# Patient Record
(demographics unavailable — no encounter records)

---

## 2024-11-12 NOTE — DATA REVIEWED
[FreeTextEntry1] : Bilateral knee x-ray was obtained today in the office (11/12/2024) which showed moderate to severe right knee OA and severe left knee OA

## 2024-11-12 NOTE — PHYSICAL EXAM
[de-identified] : L knee  No rashes, erythema, mild edema noted   TTP at medial joint line Stability: no gross instability  ROM: Painful ROM with flexion Gait: cautious, antalgic

## 2024-11-12 NOTE — HISTORY OF PRESENT ILLNESS
[FreeTextEntry1] : Patient is an 80-year-old female who is here today to establish care for left knee pain that she has had for the past 20 years.  Pain is made worse with any weight bearing movements. Pain is the most severe when going up and down the stairs. Pain is associated with sharp, shooting, stabbing pains. She has had previous injection therapy including 3 gel injection and for steroid injection which both did not give her any relief, she has not had any injection for the past 10 years. Patient also had a meniscus repair in 2012 by Dr. Dr. Marquis Aldana (orthopedic surgeon) which she states gave her no relief. She states that she is trialed NSAIDs and lidocaine creams in the past with no relief.  Bilateral knee x-ray was obtained today in the office (11/12/2024) which showed moderate to severe right knee OA and severe left knee OA.

## 2024-11-12 NOTE — DISCUSSION/SUMMARY
[de-identified] : A discussion regarding available pain management treatment options occurred with the patient. These included interventional, rehabilitative, pharmacological, and alternative modalities. We will proceed with the following:   Interventional/ Procedures: 1. Plan will be to proceed with a left knee genicular nerve block under fluoroscopic guidance.  No MAC - Patient has chronic knee pain that has not responded to 3 months of conservative therapy. The pain is interfering with activities of daily living and functionality. There is no history of systemic infection, unstable medical condition, bleeding tendency, or local infection. The injection is being performed to diagnose the genicular nerve a source of the individual's pain, to which we can apply radiofrequency ablation for more sustained relief - if the block is positive.   Imagin.  Patient had a bilateral knee x-ray done today in the office which showed right knee moderate to severe OA and left knee severe OA   Rehabilitative/alternative modalities: 1. Initiate physician directed activity and lifestyle modifications. 2. Participation in active HEP was discussed and printed.    Total clinician time spent today on the patient is 30 minutes including preparing to see the patient, obtaining and/or reviewing and confirming history, performing medically necessary and appropriate examination, counseling and educating the patient and/or family, documenting clinical information in the EHR and communicating and/or referring to other healthcare professionals.  Follow-up 2 weeks postinjection  NUNO Tamayo DO

## 2024-11-12 NOTE — HISTORY OF PRESENT ILLNESS
[FreeTextEntry1] : Patient is an 80-year-old female who is here today to establish care for left knee pain that she has had for the past 20 years.  Pain is made worse with any weight bearing movements. Pain is the most severe when going up and down the stairs. Pain is associated with sharp, shooting, stabbing pains. She has had previous injection therapy including 3 gel injection and for steroid injection which both did not give her any relief, she has not had any injection for the past 10 years. Patient also had a meniscus repair in 2012 by Dr. Dr. Marqusi Aldana (orthopedic surgeon) which she states gave her no relief. She states that she is trialed NSAIDs and lidocaine creams in the past with no relief.  Bilateral knee x-ray was obtained today in the office (11/12/2024) which showed moderate to severe right knee OA and severe left knee OA.

## 2024-11-12 NOTE — DISCUSSION/SUMMARY
[de-identified] : A discussion regarding available pain management treatment options occurred with the patient. These included interventional, rehabilitative, pharmacological, and alternative modalities. We will proceed with the following:   Interventional/ Procedures: 1. Plan will be to proceed with a left knee genicular nerve block under fluoroscopic guidance.  No MAC - Patient has chronic knee pain that has not responded to 3 months of conservative therapy. The pain is interfering with activities of daily living and functionality. There is no history of systemic infection, unstable medical condition, bleeding tendency, or local infection. The injection is being performed to diagnose the genicular nerve a source of the individual's pain, to which we can apply radiofrequency ablation for more sustained relief - if the block is positive.   Imagin.  Patient had a bilateral knee x-ray done today in the office which showed right knee moderate to severe OA and left knee severe OA   Rehabilitative/alternative modalities: 1. Initiate physician directed activity and lifestyle modifications. 2. Participation in active HEP was discussed and printed.    Total clinician time spent today on the patient is 30 minutes including preparing to see the patient, obtaining and/or reviewing and confirming history, performing medically necessary and appropriate examination, counseling and educating the patient and/or family, documenting clinical information in the EHR and communicating and/or referring to other healthcare professionals.  Follow-up 2 weeks postinjection  NUNO Tamayo DO

## 2024-11-12 NOTE — PHYSICAL EXAM
[de-identified] : L knee  No rashes, erythema, mild edema noted   TTP at medial joint line Stability: no gross instability  ROM: Painful ROM with flexion Gait: cautious, antalgic

## 2024-11-19 NOTE — PROCEDURE
[FreeTextEntry3] : Date of Service: 11/19/2024   Account: [id number]   Patient: JANELL RUDD   YOB: 1944   Age: 80 year   Surgeon:  Gaurav Eddy DO  Assistant: None   Pre-Operative Diagnosis: 1) Chronic left knee pain   Post Operative Diagnosis: 1) Chronic left knee pain   Procedure: 1) Left superior medial genicular nerve block with fluoroscopic guidance 2) Left superior lateral genicular nerve block with fluoroscopic guidance 3) Left inferomedial genicular nerve block with fluoroscopic guidance   Anesthesia:  none   This procedure was carried out using fluoroscopic guidance.  The risks and benefits of the procedure were discussed extensively with the patient.  The consent of the patient was obtained and the following procedure was performed.  The patient was placed in the supine position on the fluoroscopy table with the right knee extended straight and left knee flexed to allow for fluoroscopic lateral view of the left knee. A timeout was performed with all essential staff present and the site and side were verified.   Local anesthesia consisting of Lidocaine 1% was injected subcutaneously with a 25 gauge needle at each of the nerve block needle entry sites.  Then a 25g 3.5 spinal needle was advanced to the medial flare of the femur metaphysis/diaphysis junction on the left side. A second 25g 3.5 spinal needle was advanced to the lateral flare of the metaphysis/diaphysis junction on the left knee. A third 25g 3.5 spinal needle was advanced to the medial flare of the tibial metaphysis/diaphysis junction of the left knee. The needle position was confirmed in A/P and lateral fluoroscopic views to be at the midway point of the AP diameter of the long bone femur / tibia. 1mL of a mixture of 0.25% bupivicaine and 10mg decadron was injected at each of the three sites.   The needles were subsequently removed.  Vital signs remained normal.  Pulse oximeter was used throughout the procedure and the patient's pulse and oxygen saturation remained within normal limits.  The patient tolerated the procedure well.  There were no complications.  The patient was instructed to apply ice over the injection sites for twenty minutes every two hours for the next 24 to 48 hours.   Disposition:   1. The patient was advised to F/U in 1-2 weeks to assess the response to the injection. 2. The patient was also instructed to contact me immediately if there were any concerns related to the procedure performed.

## 2024-11-29 NOTE — PHYSICAL EXAM
[de-identified] : L knee  No rashes, erythema, mild edema noted   TTP at medial joint line Stability: no gross instability  ROM: Painful ROM with flexion Gait: cautious, antalgic

## 2024-11-29 NOTE — DISCUSSION/SUMMARY
[de-identified] : A discussion regarding available pain management treatment options occurred with the patient. These included interventional, rehabilitative, pharmacological, and alternative modalities. We will proceed with the following:   Interventional treatment options: 1. Proceed with a second confirmatory left knee genicular nerve block under sedation.  Patient has left knee chronic pain that has not responded to 3 months of conservative therapy. The pain is interfering with activities of daily living and functionality.  There is no history of systemic infection, unstable medical condition, bleeding tendency, or local infection.  The injection is being performed to diagnose the genicular nerve a source of the individual's pain, to which we can apply radiofrequency ablation for more sustained relief - if the block is positive.   The patient has severe anxiety of procedures that necessitates monitored anesthesia care (MAC). The procedure performed will be close to major nerves, arteries, and spinal cord and/or joint structures. Due to the proximity of these structures, we need the patient to be still during the procedure.  With the help of MAC, this will be safely achieved and decrease the risk of any complications.   - Follow up 2 weeks post injection.   I Tiana Biggs attest that this documentation has been prepared under the direction and in the presence of provider Dr. Gaurav Eddy.  The documentation recorded by the scribe in my presence, accurately reflects the service I personally performed, and the decisions made by me with my edits as appropriate.   Gaurav Eddy, DO

## 2024-11-29 NOTE — HISTORY OF PRESENT ILLNESS
[FreeTextEntry1] : Patient is an 80-year-old female who is here today to establish care for left knee pain that she has had for the past 20 years.  Pain is made worse with any weight bearing movements. Pain is the most severe when going up and down the stairs. Pain is associated with sharp, shooting, stabbing pains. She has had previous injection therapy including 3 gel injection and for steroid injection which both did not give her any relief, she has not had any injection for the past 10 years. Patient also had a meniscus repair in 2012 by Dr. Dr. Marquis Aldana (orthopedic surgeon) which she states gave her no relief. She states that she is trialed NSAIDs and lidocaine creams in the past with no relief.  Bilateral knee x-ray was obtained today in the office (11/12/2024) which showed moderate to severe right knee OA and severe left knee OA.  11-: Revisit encounter.   Since her last office visit, she underwent a Left knee genicular nerve block on 11-. For the first 2 days, she had 100% pain relief. Her pain then slowly started to return. She continues today with ongoing 50% sustained relief. She is now able to ambulate better and is able to go up and down stairs with little to no pain. She rates the pain at a 5/10 on the pain scale.

## 2024-11-29 NOTE — PHYSICAL EXAM
[de-identified] : L knee  No rashes, erythema, mild edema noted   TTP at medial joint line Stability: no gross instability  ROM: Painful ROM with flexion Gait: cautious, antalgic

## 2024-11-29 NOTE — DISCUSSION/SUMMARY
[de-identified] : A discussion regarding available pain management treatment options occurred with the patient. These included interventional, rehabilitative, pharmacological, and alternative modalities. We will proceed with the following:   Interventional treatment options: 1. Proceed with a second confirmatory left knee genicular nerve block under sedation.  Patient has left knee chronic pain that has not responded to 3 months of conservative therapy. The pain is interfering with activities of daily living and functionality.  There is no history of systemic infection, unstable medical condition, bleeding tendency, or local infection.  The injection is being performed to diagnose the genicular nerve a source of the individual's pain, to which we can apply radiofrequency ablation for more sustained relief - if the block is positive.   The patient has severe anxiety of procedures that necessitates monitored anesthesia care (MAC). The procedure performed will be close to major nerves, arteries, and spinal cord and/or joint structures. Due to the proximity of these structures, we need the patient to be still during the procedure.  With the help of MAC, this will be safely achieved and decrease the risk of any complications.   - Follow up 2 weeks post injection.   I Tiana Biggs attest that this documentation has been prepared under the direction and in the presence of provider Dr. Gaurav Eddy.  The documentation recorded by the scribe in my presence, accurately reflects the service I personally performed, and the decisions made by me with my edits as appropriate.   Gaurav Eddy, DO

## 2024-12-09 NOTE — PROCEDURE
[FreeTextEntry3] : Date of Service: 12/05/2024   Account: [id number]   Patient: JANELL RUDD   YOB: 1944   Age: 80 year   Surgeon:  Gaurav Eddy DO  Assistant: None   Pre-Operative Diagnosis: 1) Chronic left knee pain   Post Operative Diagnosis: 1) Chronic left knee pain   Procedure: 1) Left superior medial genicular nerve block with fluoroscopic guidance 2) Left superior lateral genicular nerve block with fluoroscopic guidance 3) Left inferomedial genicular nerve block with fluoroscopic guidance   Anesthesia:  none   This procedure was carried out using fluoroscopic guidance.  The risks and benefits of the procedure were discussed extensively with the patient.  The consent of the patient was obtained and the following procedure was performed.  The patient was placed in the supine position on the fluoroscopy table with the right knee extended straight and left knee flexed to allow for fluoroscopic lateral view of the left knee. A timeout was performed with all essential staff present and the site and side were verified.   Local anesthesia consisting of Lidocaine 1% was injected subcutaneously with a 25 gauge needle at each of the nerve block needle entry sites.  Then a 25g 3.5 spinal needle was advanced to the medial flare of the femur metaphysis/diaphysis junction on the left side. A second 25g 3.5 spinal needle was advanced to the lateral flare of the metaphysis/diaphysis junction on the left knee. A third 25g 3.5 spinal needle was advanced to the medial flare of the tibial metaphysis/diaphysis junction of the left knee. The needle position was confirmed in A/P and lateral fluoroscopic views to be at the midway point of the AP diameter of the long bone femur / tibia. 1mL of a mixture of 0.25% bupivicaine and 10mg decadron was injected at each of the three sites.   The needles were subsequently removed.  Vital signs remained normal.  Pulse oximeter was used throughout the procedure and the patient's pulse and oxygen saturation remained within normal limits.  The patient tolerated the procedure well.  There were no complications.  The patient was instructed to apply ice over the injection sites for twenty minutes every two hours for the next 24 to 48 hours.   Disposition:   1. The patient was advised to F/U in 1-2 weeks to assess the response to the injection. 2. The patient was also instructed to contact me immediately if there were any concerns related to the procedure performed.

## 2024-12-16 NOTE — PHYSICAL EXAM
[de-identified] : L knee  No rashes, erythema, mild edema noted   TTP at medial joint line Stability: no gross instability  ROM: Painful ROM with flexion Gait: cautious, antalgic

## 2024-12-16 NOTE — HISTORY OF PRESENT ILLNESS
[FreeTextEntry1] : Patient is an 80-year-old female who is here today to establish care for left knee pain that she has had for the past 20 years.  Pain is made worse with any weight bearing movements. Pain is the most severe when going up and down the stairs. Pain is associated with sharp, shooting, stabbing pains. She has had previous injection therapy including 3 gel injection and for steroid injection which both did not give her any relief, she has not had any injection for the past 10 years. Patient also had a meniscus repair in 2012 by Dr. Dr. Marquis Aldana (orthopedic surgeon) which she states gave her no relief. She states that she is trialed NSAIDs and lidocaine creams in the past with no relief.  Bilateral knee x-ray was obtained today in the office (11/12/2024) which showed moderate to severe right knee OA and severe left knee OA.  11-: Revisit encounter.   Since her last office visit, she underwent a Left knee genicular nerve block on 11-. For the first 2 days, she had 100% pain relief. Her pain then slowly started to return. She continues today with ongoing 50% sustained relief. She is now able to ambulate better and is able to go up and down stairs with little to no pain. She rates the pain at a 5/10 on the pain scale.   12-: Revisit encounter.  Since her last office visit, she underwent a Left knee genicular nerve block on 12-5-24 which gave her 50% relief to date, she states that her pain is starting to go back to baseline, and she would like to undergo the RFA since she will be leaving to [place] before the holidays. Pain is made worse with any weight bearing movements. Pain is the most severe when going up and down the stairs. Pain is associated with sharp, shooting, stabbing pains. Her pain s 6/10 on a pain scale today.

## 2024-12-16 NOTE — PHYSICAL EXAM
[de-identified] : L knee  No rashes, erythema, mild edema noted   TTP at medial joint line Stability: no gross instability  ROM: Painful ROM with flexion Gait: cautious, antalgic

## 2024-12-26 NOTE — PROCEDURE
[FreeTextEntry3] : Date of Service: 12/23/2024   Account: [id number]  Patient: JANELL RUDD   YOB: 1944  Age: 80 year  Surgeon:  Gaurav Eddy DO  Assistant: None  Pre-Operative Diagnosis:  1) Chronic left knee pain 2) Left knee osteoarthritis  Post Operative Diagnosis: 1) Chronic left knee pain 2) Left knee osteoarthritis  Procedure: 1) Left superior medial genicular nerve radiofrequency ablation with fluoroscopic guidance 2) Left superior lateral genicular nerve radiofrequency ablation with fluoroscopic guidance 3) Left inferomedial genicular nerve radiofrequency ablation with fluoroscopic guidance   Anesthesia:  MAC  This procedure was carried out using fluoroscopic guidance.  The risks and benefits of the procedure were discussed extensively with the patient.  The consent of the patient was obtained and the following procedure was performed.  The patient was placed in the supine position on the fluoroscopy table with the left knee extended straight and right knee flexed to allow for fluoroscopic lateral view of the right knee. A timeout was performed with all essential staff present and the site and side were verified.  Local anesthesia consisting of Lidocaine 1% was injected subcutaneously with a 25 gauge needle at each of the nerve block needle entry sites.  Then a nimbus needle was advanced under fluoroscopic guidance to medial flare of the femur metaphysis/diaphysis junction on the right side.  The needle position was confirmed in A/P and lateral fluoroscopic views. Sensory stimulation was performed at 50 Hz not exceeding 1.0 volt and motor stimulation testing at 2 Hz not exceeding 3.0 volts. There was no motor stimulation observed or reported by the patient most notably in the lower extremities Then 1 ml of 0.25% Bupivacaine was injected after negative aspiration at the right superior medial genicular branch. THe nimbus needle was deployed resulting in a visualization of two structures that create the lesion. This area was then ablated at 80 degrees centigrade for 90 seconds. This was repeated in the exact same fashion at the left superior lateral genicular branch and inferior medial genicular branch.  The needles were subsequently removed.  Vital signs remained normal.  Pulse oximeter was used throughout the procedure and the patient's pulse and oxygen saturation remained within normal limits.  The patient tolerated the procedure well.  There were no complications.  The patient was instructed to apply ice over the injection sites for twenty minutes every two hours for the next 24 to 48 hours.  Disposition:  1. The patient was advised to F/U in 1-2 weeks to assess the response to the injection. 2. The patient was also instructed to contact me immediately if there were any concerns related to the procedure performed. .